# Patient Record
Sex: MALE | Race: WHITE | Employment: FULL TIME | URBAN - METROPOLITAN AREA
[De-identification: names, ages, dates, MRNs, and addresses within clinical notes are randomized per-mention and may not be internally consistent; named-entity substitution may affect disease eponyms.]

---

## 2017-01-04 ENCOUNTER — TELEPHONE (OUTPATIENT)
Dept: INTERNAL MEDICINE CLINIC | Facility: CLINIC | Age: 47
End: 2017-01-04

## 2017-01-04 DIAGNOSIS — E66.01 MORBID OBESITY DUE TO EXCESS CALORIES (HCC): Primary | ICD-10-CM

## 2017-01-04 DIAGNOSIS — E11.65 UNCONTROLLED TYPE 2 DIABETES MELLITUS WITH HYPERGLYCEMIA, WITHOUT LONG-TERM CURRENT USE OF INSULIN (HCC): ICD-10-CM

## 2017-01-04 PROBLEM — IMO0002 UNCONTROLLED DIABETES MELLITUS: Status: ACTIVE | Noted: 2017-01-04

## 2017-01-04 NOTE — TELEPHONE ENCOUNTER
I called patient again (last call in 10/2016) to inform him he is long overdue for labs and office visit. Orders are in the system. I left a message explaining he needs to have labs done asap and see me a few days later.

## 2017-01-10 ENCOUNTER — MED REC SCAN ONLY (OUTPATIENT)
Dept: INTERNAL MEDICINE CLINIC | Facility: CLINIC | Age: 47
End: 2017-01-10

## 2017-02-24 ENCOUNTER — OFFICE VISIT (OUTPATIENT)
Dept: INTERNAL MEDICINE CLINIC | Facility: CLINIC | Age: 47
End: 2017-02-24

## 2017-02-24 VITALS
BODY MASS INDEX: 43 KG/M2 | DIASTOLIC BLOOD PRESSURE: 86 MMHG | TEMPERATURE: 99 F | WEIGHT: 315 LBS | SYSTOLIC BLOOD PRESSURE: 136 MMHG | OXYGEN SATURATION: 96 % | HEART RATE: 95 BPM

## 2017-02-24 DIAGNOSIS — Z72.0 TOBACCO USE: ICD-10-CM

## 2017-02-24 DIAGNOSIS — J02.0 ACUTE STREPTOCOCCAL PHARYNGITIS: Primary | ICD-10-CM

## 2017-02-24 DIAGNOSIS — E66.01 MORBID OBESITY DUE TO EXCESS CALORIES (HCC): ICD-10-CM

## 2017-02-24 DIAGNOSIS — E11.65 UNCONTROLLED TYPE 2 DIABETES MELLITUS WITH HYPERGLYCEMIA, WITHOUT LONG-TERM CURRENT USE OF INSULIN (HCC): ICD-10-CM

## 2017-02-24 LAB
CONTROL LINE PRESENT WITH A CLEAR BACKGROUND (YES/NO): YES YES/NO
STREP GRP A CUL-SCR: POSITIVE

## 2017-02-24 PROCEDURE — 99214 OFFICE O/P EST MOD 30 MIN: CPT | Performed by: INTERNAL MEDICINE

## 2017-02-24 PROCEDURE — 87880 STREP A ASSAY W/OPTIC: CPT | Performed by: INTERNAL MEDICINE

## 2017-02-24 RX ORDER — PROMETHAZINE HYDROCHLORIDE AND CODEINE PHOSPHATE 6.25; 1 MG/5ML; MG/5ML
5 SYRUP ORAL EVERY 6 HOURS PRN
Qty: 118 ML | Refills: 0 | Status: SHIPPED | OUTPATIENT
Start: 2017-02-24 | End: 2017-08-26

## 2017-02-24 RX ORDER — CEPHALEXIN 500 MG/1
500 CAPSULE ORAL 2 TIMES DAILY
Qty: 20 CAPSULE | Refills: 0 | Status: SHIPPED | OUTPATIENT
Start: 2017-02-24 | End: 2017-03-03

## 2017-02-24 NOTE — PROGRESS NOTES
Terrence Kumar is a 40year old male. HPI:   Patient presents with:  Cough: Dry cough, sore throat, and a lot of sweating x 2 days. Vomiting: all day yesterday. Last meal was 2/22/17. Has been drinking tea.    Patient presents with two days of uppe He has been smoking about 0.00 packs per day for the past 0 years. He does not have any smokeless tobacco history on file. He reports that he does not drink alcohol or use illicit drugs.     Wt Readings from Last 6 Encounters:  02/24/17 : 376 lb 4 oz  08/31 patient indicates understanding of these issues and agrees to the plan. The patient is asked to return to clinic asap with Dr. Amelie Yuen MD for follow up on chronic issues.     Brian Crain MD

## 2017-03-03 ENCOUNTER — TELEPHONE (OUTPATIENT)
Dept: INTERNAL MEDICINE CLINIC | Facility: CLINIC | Age: 47
End: 2017-03-03

## 2017-03-03 RX ORDER — PENICILLIN V POTASSIUM 500 MG/1
500 TABLET ORAL 3 TIMES DAILY
Qty: 30 TABLET | Refills: 0 | Status: SHIPPED | OUTPATIENT
Start: 2017-03-03 | End: 2017-03-13

## 2017-03-03 NOTE — TELEPHONE ENCOUNTER
Prescription sent in for penicillin V, 1 tab three times daily x 10 days. Should start this in place of cephalexin.

## 2017-03-03 NOTE — TELEPHONE ENCOUNTER
Pt was seen 2/24/17, DX with strep A, RX cephalexin 500 mg BID #20 and promethazine/codine. Pt stated that he has been taking both medications with no relief. Still c/o ST, PND, dry cough, SOB, chills and sweats.  Pt denies wheezing/fever/body aches/inc

## 2017-06-02 ENCOUNTER — MED REC SCAN ONLY (OUTPATIENT)
Dept: INTERNAL MEDICINE CLINIC | Facility: CLINIC | Age: 47
End: 2017-06-02

## 2017-07-03 ENCOUNTER — TELEPHONE (OUTPATIENT)
Dept: INTERNAL MEDICINE CLINIC | Facility: CLINIC | Age: 47
End: 2017-07-03

## 2017-07-12 NOTE — TELEPHONE ENCOUNTER
Spoke with Marlin Kruse,  from [facility] [place]. They do not have a different address for this patient on file. Based on the letter effective 7/1/2017- Dr. Bertha Hale can be removed as PCP.

## 2017-08-26 ENCOUNTER — HOSPITAL ENCOUNTER (EMERGENCY)
Facility: HOSPITAL | Age: 47
Discharge: HOME | End: 2017-08-26
Attending: EMERGENCY MEDICINE
Payer: COMMERCIAL

## 2017-08-26 VITALS
SYSTOLIC BLOOD PRESSURE: 148 MMHG | BODY MASS INDEX: 36.45 KG/M2 | DIASTOLIC BLOOD PRESSURE: 85 MMHG | RESPIRATION RATE: 17 BRPM | TEMPERATURE: 97 F | OXYGEN SATURATION: 97 % | HEIGHT: 78 IN | HEART RATE: 78 BPM | WEIGHT: 315 LBS

## 2017-08-26 DIAGNOSIS — E11.65 TYPE 2 DIABETES MELLITUS WITH HYPERGLYCEMIA, WITHOUT LONG-TERM CURRENT USE OF INSULIN (HCC): Primary | ICD-10-CM

## 2017-08-26 DIAGNOSIS — R42 DIZZINESS: ICD-10-CM

## 2017-08-26 LAB
ALBUMIN SERPL-MCNC: 3.5 G/DL (ref 3.5–4.8)
ALP LIVER SERPL-CCNC: 84 U/L (ref 45–117)
ALT SERPL-CCNC: 38 U/L (ref 17–63)
AST SERPL-CCNC: 18 U/L (ref 15–41)
ATRIAL RATE: 86 BPM
BASOPHILS # BLD AUTO: 0.02 X10(3) UL (ref 0–0.1)
BASOPHILS NFR BLD AUTO: 0.2 %
BILIRUB SERPL-MCNC: 0.7 MG/DL (ref 0.1–2)
BUN BLD-MCNC: 12 MG/DL (ref 8–20)
CALCIUM BLD-MCNC: 9.4 MG/DL (ref 8.3–10.3)
CHLORIDE: 99 MMOL/L (ref 101–111)
CO2: 26 MMOL/L (ref 22–32)
CREAT BLD-MCNC: 0.9 MG/DL (ref 0.7–1.3)
EOSINOPHIL # BLD AUTO: 0.21 X10(3) UL (ref 0–0.3)
EOSINOPHIL NFR BLD AUTO: 2.2 %
ERYTHROCYTE [DISTWIDTH] IN BLOOD BY AUTOMATED COUNT: 12 % (ref 11.5–16)
GLUCOSE BLD-MCNC: 261 MG/DL (ref 65–99)
GLUCOSE BLD-MCNC: 322 MG/DL (ref 70–99)
HCT VFR BLD AUTO: 43.9 % (ref 37–53)
HGB BLD-MCNC: 15.3 G/DL (ref 13–17)
IMMATURE GRANULOCYTE COUNT: 0.03 X10(3) UL (ref 0–1)
IMMATURE GRANULOCYTE RATIO %: 0.3 %
LYMPHOCYTES # BLD AUTO: 2.61 X10(3) UL (ref 0.9–4)
LYMPHOCYTES NFR BLD AUTO: 27 %
M PROTEIN MFR SERPL ELPH: 8 G/DL (ref 6.1–8.3)
MCH RBC QN AUTO: 29.5 PG (ref 27–33.2)
MCHC RBC AUTO-ENTMCNC: 34.9 G/DL (ref 31–37)
MCV RBC AUTO: 84.7 FL (ref 80–99)
MONOCYTES # BLD AUTO: 0.65 X10(3) UL (ref 0.1–0.6)
MONOCYTES NFR BLD AUTO: 6.7 %
NEUTROPHIL ABS PRELIM: 6.16 X10 (3) UL (ref 1.3–6.7)
NEUTROPHILS # BLD AUTO: 6.16 X10(3) UL (ref 1.3–6.7)
NEUTROPHILS NFR BLD AUTO: 63.6 %
P AXIS: 60 DEGREES
P-R INTERVAL: 174 MS
PLATELET # BLD AUTO: 278 10(3)UL (ref 150–450)
POTASSIUM SERPL-SCNC: 4.3 MMOL/L (ref 3.6–5.1)
Q-T INTERVAL: 362 MS
QRS DURATION: 92 MS
QTC CALCULATION (BEZET): 433 MS
R AXIS: 3 DEGREES
RBC # BLD AUTO: 5.18 X10(6)UL (ref 4.3–5.7)
RED CELL DISTRIBUTION WIDTH-SD: 36.7 FL (ref 35.1–46.3)
SODIUM SERPL-SCNC: 134 MMOL/L (ref 136–144)
T AXIS: 15 DEGREES
VENTRICULAR RATE: 86 BPM
WBC # BLD AUTO: 9.7 X10(3) UL (ref 4–13)

## 2017-08-26 PROCEDURE — 99284 EMERGENCY DEPT VISIT MOD MDM: CPT

## 2017-08-26 PROCEDURE — 85025 COMPLETE CBC W/AUTO DIFF WBC: CPT | Performed by: EMERGENCY MEDICINE

## 2017-08-26 PROCEDURE — 96361 HYDRATE IV INFUSION ADD-ON: CPT

## 2017-08-26 PROCEDURE — 82962 GLUCOSE BLOOD TEST: CPT

## 2017-08-26 PROCEDURE — 96360 HYDRATION IV INFUSION INIT: CPT

## 2017-08-26 PROCEDURE — 93010 ELECTROCARDIOGRAM REPORT: CPT

## 2017-08-26 PROCEDURE — 96372 THER/PROPH/DIAG INJ SC/IM: CPT

## 2017-08-26 PROCEDURE — 93005 ELECTROCARDIOGRAM TRACING: CPT

## 2017-08-26 PROCEDURE — 80053 COMPREHEN METABOLIC PANEL: CPT | Performed by: EMERGENCY MEDICINE

## 2017-08-26 RX ORDER — GLIPIZIDE 10 MG/1
5 TABLET ORAL
Qty: 20 TABLET | Refills: 0 | Status: SHIPPED | OUTPATIENT
Start: 2017-08-26

## 2017-08-26 RX ORDER — INSULIN ASPART 100 [IU]/ML
0.1 INJECTION, SOLUTION INTRAVENOUS; SUBCUTANEOUS ONCE
Status: COMPLETED | OUTPATIENT
Start: 2017-08-26 | End: 2017-08-26

## 2017-08-26 NOTE — ED PROVIDER NOTES
Patient Seen in: [facility] Emergency Department    History   Patient presents with:  Dizziness (neurologic)  Hyperglycemia (metabolic)    Stated Complaint: dizziness    HPI    Patient is a 64-year-old male who is a .   Patient says he 84  Resp: 20  Temp: (!) 97 °F (36.1 °C)  Temp src: Temporal  SpO2: 95 %  O2 Device: None (Room air)    Current:/83   Pulse 80   Temp (!) 97 °F (36.1 °C) (Temporal)   Resp 18   Ht 198.1 cm (6' 6\")   Wt (!) 158.8 kg   SpO2 97%   BMI 40.45 kg/m² noted.   Otherwise, agree with EKG report           ============================================================  ED Course  ------------------------------------------------------------  MDM   Patient is a 28 pound 81-year-old gentleman who presents to the

## 2017-08-26 NOTE — ED INITIAL ASSESSMENT (HPI)
Patient had to spray [place] capsum at his job at [employer] on 8/25. [place] \"off\" the rest of the day, had elevated BP and blood sugar 300+.

## 2017-09-09 ENCOUNTER — HOSPITAL ENCOUNTER (EMERGENCY)
Facility: HOSPITAL | Age: 47
Discharge: HOME | End: 2017-09-09
Attending: EMERGENCY MEDICINE
Payer: COMMERCIAL

## 2017-09-09 VITALS
BODY MASS INDEX: 36.45 KG/M2 | DIASTOLIC BLOOD PRESSURE: 72 MMHG | OXYGEN SATURATION: 94 % | WEIGHT: 315 LBS | HEART RATE: 100 BPM | HEIGHT: 78 IN | SYSTOLIC BLOOD PRESSURE: 128 MMHG | RESPIRATION RATE: 18 BRPM | TEMPERATURE: 97 F

## 2017-09-09 DIAGNOSIS — L02.31 GLUTEAL ABSCESS: Primary | ICD-10-CM

## 2017-09-09 PROCEDURE — 99283 EMERGENCY DEPT VISIT LOW MDM: CPT

## 2017-09-09 PROCEDURE — 10060 I&D ABSCESS SIMPLE/SINGLE: CPT

## 2017-09-09 RX ORDER — ASPIRIN 81 MG/1
TABLET, CHEWABLE ORAL DAILY
COMMUNITY

## 2017-09-09 RX ORDER — CEPHALEXIN 500 MG/1
500 CAPSULE ORAL 3 TIMES DAILY
Qty: 40 CAPSULE | Refills: 0 | Status: SHIPPED | OUTPATIENT
Start: 2017-09-09 | End: 2017-09-19

## 2017-09-09 RX ORDER — HYDROCODONE BITARTRATE AND ACETAMINOPHEN 5; 325 MG/1; MG/1
1-2 TABLET ORAL EVERY 4 HOURS PRN
Qty: 20 TABLET | Refills: 0 | Status: SHIPPED | OUTPATIENT
Start: 2017-09-09 | End: 2017-09-16

## 2017-09-09 NOTE — ED INITIAL ASSESSMENT (HPI)
Here for an evaluation of either a \"boil or abscess\" in his left buttock area. Patient said it's been there for a couple weeks, with some drainage. Patient said over the past couple days, area is more swollen and painful.

## 2017-09-11 NOTE — ED PROVIDER NOTES
Patient Seen in: [facility] Emergency Department    History   Patient presents with:  Abscess (integumentary)    Stated Complaint: abscess    HPI    41-year-old male presents emergency department who states he started feeling an abscess or swelling is alert and in no acute distress  HEENT: Pupils equal react to light extraocular muscles intact no scleral icterus, mucous membranes are moist, there is no erythema or exudate in the posterior pharynx  Neck: Supple no JVD no lymphadenopathy no meningismus [id number]  [phone]            Medications Prescribed:  Discharge Medication List as of 9/9/2017  2:04 PM    START taking these medications    HYDROcodone-acetaminophen 5-325 MG Oral Tab  Take 1-2 tablets by mouth every 4 (four) hours as needed for Pa

## 2017-10-05 ENCOUNTER — OFFICE VISIT (OUTPATIENT)
Dept: SURGERY | Facility: CLINIC | Age: 47
End: 2017-10-05

## 2017-10-05 VITALS
HEART RATE: 91 BPM | DIASTOLIC BLOOD PRESSURE: 88 MMHG | HEIGHT: 78 IN | TEMPERATURE: 98 F | SYSTOLIC BLOOD PRESSURE: 124 MMHG | BODY MASS INDEX: 36.45 KG/M2 | WEIGHT: 315 LBS

## 2017-10-05 DIAGNOSIS — L02.31 LEFT BUTTOCK ABSCESS: Primary | ICD-10-CM

## 2017-10-05 PROCEDURE — 10061 I&D ABSCESS COMP/MULTIPLE: CPT | Performed by: SURGERY

## 2017-10-05 PROCEDURE — 99243 OFF/OP CNSLTJ NEW/EST LOW 30: CPT | Performed by: SURGERY

## 2017-10-05 RX ORDER — ATORVASTATIN CALCIUM 40 MG/1
40 TABLET, FILM COATED ORAL NIGHTLY
COMMUNITY

## 2017-10-05 RX ORDER — CIPROFLOXACIN 500 MG/1
TABLET, FILM COATED ORAL 3 TIMES DAILY
COMMUNITY
End: 2018-02-12

## 2017-10-05 NOTE — H&P
New Patient Visit Note       Active Problems      1.  Left buttock abscess        Chief Complaint   Patient presents with:  Anal Problem (GI): boil on buttocks, on Cipro tid, decresed pain and swelling with antibiotic, drains intermittently, I&D 9/11 in ER Topics    Smoking status: Current Some Day Smoker                                                      Packs/day: 0.00      Years: 0.00           Types: Cigarettes    Smokeless tobacco: Never Used                        Comment: Smokes 2-3 cigarettes daily Psychiatric/Behavioral: Negative for behavioral problems and sleep disturbance.        Physical Findings   /88   Pulse 91   Temp 97.8 °F (36.6 °C)   Ht 78\"   Wt (!) 370 lb   BMI 42.76 kg/m²   Physical Exam   Constitutional: He is oriented to person note and vitals reviewed. Assessment   Left buttock abscess  (primary encounter diagnosis)      Plan     ·  Successful incision and drainage of left buttock abscess in the office today.   · Wound care is discussed with the patient at length and he

## 2017-10-12 ENCOUNTER — OFFICE VISIT (OUTPATIENT)
Dept: SURGERY | Facility: CLINIC | Age: 47
End: 2017-10-12

## 2017-10-12 VITALS
TEMPERATURE: 98 F | HEART RATE: 86 BPM | SYSTOLIC BLOOD PRESSURE: 143 MMHG | BODY MASS INDEX: 36.45 KG/M2 | DIASTOLIC BLOOD PRESSURE: 95 MMHG | WEIGHT: 315 LBS | HEIGHT: 78 IN

## 2017-10-12 DIAGNOSIS — L02.31 LEFT BUTTOCK ABSCESS: Primary | ICD-10-CM

## 2017-10-12 PROCEDURE — 99024 POSTOP FOLLOW-UP VISIT: CPT | Performed by: SURGERY

## 2017-10-12 NOTE — PROGRESS NOTES
Follow Up Visit Note       Active Problems      1. Left buttock abscess          Chief Complaint   Patient presents with:  Anal Problem (GI): 1 week follow up Left buttock abscess.  Still on antibiotics,no pain, minimal drainage        History of Present Il Concern        Yes    Comment:0-1 cup of coffee daily and 1-2 cups of tea             daily   Exercise                Yes    Comment:5x/week. Current Outpatient Prescriptions:  Ciprofloxacin HCl 500 MG Oral Tab Take by mouth 3 (three) times daily. icterus. Neck: No tracheal deviation present. Genitourinary: Rectal exam shows no external hemorrhoid, no internal hemorrhoid, no fissure, no mass, no tenderness and anal tone normal. Prostate is not enlarged and not tender.          Genitourinary Comme

## 2018-02-12 ENCOUNTER — HOSPITAL ENCOUNTER (EMERGENCY)
Facility: HOSPITAL | Age: 48
Discharge: HOME | End: 2018-02-12
Attending: EMERGENCY MEDICINE
Payer: COMMERCIAL

## 2018-02-12 VITALS
RESPIRATION RATE: 18 BRPM | SYSTOLIC BLOOD PRESSURE: 144 MMHG | DIASTOLIC BLOOD PRESSURE: 81 MMHG | HEIGHT: 78 IN | TEMPERATURE: 97 F | OXYGEN SATURATION: 96 % | BODY MASS INDEX: 36.45 KG/M2 | WEIGHT: 315 LBS | HEART RATE: 108 BPM

## 2018-02-12 DIAGNOSIS — H92.02 LEFT EAR PAIN: Primary | ICD-10-CM

## 2018-02-12 PROCEDURE — 99282 EMERGENCY DEPT VISIT SF MDM: CPT

## 2018-02-12 NOTE — ED INITIAL ASSESSMENT (HPI)
Pt with pain to left ear and radiates down to neck. Pt reports blowing his nose, the ear \"popped\", then he had intense pain. Pt denies fever or dizziness. 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

## 2018-02-12 NOTE — ED PROVIDER NOTES
Patient Seen in: [facility] Emergency Department    History   Patient presents with:  Headache (neurologic)  Cough/URI    Stated Complaint: head pain/nasal congestion/ear     HPI    The patient is a 60-year-old gentleman complains of left ear pain a kg/m²         Physical Exam   Constitutional: He is oriented to person, place, and time. He appears well-developed and well-nourished. No distress. HENT:   Head: Normocephalic and atraumatic.    Mouth/Throat: Oropharynx is clear and moist. No oropharyngea MD  [address]  [phone]    In 2 days          Medications Prescribed:  Current Discharge Medication List

## 2020-03-02 ENCOUNTER — HOSPITAL ENCOUNTER (EMERGENCY)
Facility: HOSPITAL | Age: 50
Discharge: HOME | End: 2020-03-02
Attending: EMERGENCY MEDICINE
Payer: COMMERCIAL

## 2020-03-02 VITALS
TEMPERATURE: 97 F | HEART RATE: 96 BPM | HEIGHT: 78 IN | DIASTOLIC BLOOD PRESSURE: 106 MMHG | WEIGHT: 315 LBS | OXYGEN SATURATION: 93 % | RESPIRATION RATE: 16 BRPM | SYSTOLIC BLOOD PRESSURE: 159 MMHG | BODY MASS INDEX: 36.45 KG/M2

## 2020-03-02 DIAGNOSIS — K08.89 SUBLUXATION OF TOOTH: Primary | ICD-10-CM

## 2020-03-02 PROCEDURE — 99283 EMERGENCY DEPT VISIT LOW MDM: CPT

## 2020-03-02 RX ORDER — IBUPROFEN 600 MG/1
600 TABLET ORAL EVERY 8 HOURS PRN
Qty: 30 TABLET | Refills: 0 | Status: SHIPPED | OUTPATIENT
Start: 2020-03-02 | End: 2020-03-09

## 2020-03-02 RX ORDER — HYDROCODONE BITARTRATE AND ACETAMINOPHEN 5; 325 MG/1; MG/1
1 TABLET ORAL EVERY 6 HOURS PRN
Qty: 10 TABLET | Refills: 0 | Status: SHIPPED | OUTPATIENT
Start: 2020-03-02 | End: 2020-03-09

## 2020-03-02 RX ORDER — PENICILLIN V POTASSIUM 500 MG/1
500 TABLET ORAL 4 TIMES DAILY
Qty: 40 TABLET | Refills: 0 | Status: SHIPPED | OUTPATIENT
Start: 2020-03-02 | End: 2020-03-12

## 2020-03-02 NOTE — ED PROVIDER NOTES
Patient Seen in: [facility] Emergency Department      History   Patient presents with:  Dental Problem  Facial Trauma    Stated Complaint: Rt lower dental pain due to facial injury? - hit face with a wrench    HPI    The patient is a 46-year-old mal 95 %   O2 Device None (Room air)       Current:BP (!) 159/106   Pulse 96   Temp 97.2 °F (36.2 °C) (Temporal)   Resp 16   Ht 198.1 cm (6' 6\")   Wt (!) 150.1 kg   SpO2 93%   BMI 38.25 kg/m²         Physical Exam    General: Comfortable and well appearing.  A prophylactic antibiotics. He is to return if develops any worrisome symptoms. Otherwise he is to follow-up with oral surgery. He felt comfortable with this plan is discharged home in stable condition.               Disposition and Plan     Clinical Impre

## 2020-03-02 NOTE — ED INITIAL ASSESSMENT (HPI)
Presents with right lower molar tooth pain. On Sunday am was working on car and hit lower jaw with a wrench, later in afternoon was at work eating lunch and felt a \" crunch \" on tooth,tooth now loose and painful.

## 2020-05-03 ENCOUNTER — APPOINTMENT (OUTPATIENT)
Dept: CT IMAGING | Facility: HOSPITAL | Age: 50
End: 2020-05-03
Attending: EMERGENCY MEDICINE
Payer: COMMERCIAL

## 2020-05-03 ENCOUNTER — HOSPITAL ENCOUNTER (EMERGENCY)
Facility: HOSPITAL | Age: 50
Discharge: ANOTHER HOSPITAL | End: 2020-05-04
Attending: EMERGENCY MEDICINE
Payer: COMMERCIAL

## 2020-05-03 DIAGNOSIS — L03.115 CELLULITIS OF RIGHT LEG: Primary | ICD-10-CM

## 2020-05-03 PROCEDURE — 99223 1ST HOSP IP/OBS HIGH 75: CPT | Performed by: HOSPITALIST

## 2020-05-03 PROCEDURE — 74177 CT ABD & PELVIS W/CONTRAST: CPT | Performed by: EMERGENCY MEDICINE

## 2020-05-03 RX ORDER — ONDANSETRON 2 MG/ML
4 INJECTION INTRAMUSCULAR; INTRAVENOUS EVERY 6 HOURS PRN
Status: CANCELLED | OUTPATIENT
Start: 2020-05-03

## 2020-05-03 RX ORDER — ACETAMINOPHEN 325 MG/1
650 TABLET ORAL EVERY 6 HOURS PRN
Status: CANCELLED | OUTPATIENT
Start: 2020-05-03

## 2020-05-03 RX ORDER — HYDROMORPHONE HYDROCHLORIDE 1 MG/ML
0.5 INJECTION, SOLUTION INTRAMUSCULAR; INTRAVENOUS; SUBCUTANEOUS EVERY 30 MIN PRN
Status: DISCONTINUED | OUTPATIENT
Start: 2020-05-03 | End: 2020-05-04

## 2020-05-03 RX ORDER — ACETAMINOPHEN 500 MG
1000 TABLET ORAL ONCE
Status: COMPLETED | OUTPATIENT
Start: 2020-05-03 | End: 2020-05-03

## 2020-05-03 RX ORDER — SODIUM CHLORIDE, SODIUM LACTATE, POTASSIUM CHLORIDE, CALCIUM CHLORIDE 600; 310; 30; 20 MG/100ML; MG/100ML; MG/100ML; MG/100ML
INJECTION, SOLUTION INTRAVENOUS CONTINUOUS
Status: CANCELLED | OUTPATIENT
Start: 2020-05-03

## 2020-05-03 RX ORDER — VANCOMYCIN 2 GRAM/500 ML IN 0.9 % SODIUM CHLORIDE INTRAVENOUS
25 ONCE
Status: CANCELLED | OUTPATIENT
Start: 2020-05-03 | End: 2020-05-03

## 2020-05-03 RX ORDER — DEXTROSE MONOHYDRATE 25 G/50ML
50 INJECTION, SOLUTION INTRAVENOUS
Status: CANCELLED | OUTPATIENT
Start: 2020-05-03

## 2020-05-03 RX ORDER — ENOXAPARIN SODIUM 100 MG/ML
40 INJECTION SUBCUTANEOUS DAILY
Status: CANCELLED | OUTPATIENT
Start: 2020-05-03

## 2020-05-03 RX ORDER — METOCLOPRAMIDE HYDROCHLORIDE 5 MG/ML
5 INJECTION INTRAMUSCULAR; INTRAVENOUS EVERY 8 HOURS PRN
Status: CANCELLED | OUTPATIENT
Start: 2020-05-03

## 2020-05-03 RX ORDER — ACETAMINOPHEN 500 MG
1000 TABLET ORAL ONCE
Status: DISCONTINUED | OUTPATIENT
Start: 2020-05-03 | End: 2020-05-03

## 2020-05-03 RX ORDER — ATORVASTATIN CALCIUM 40 MG/1
40 TABLET, FILM COATED ORAL NIGHTLY
Status: CANCELLED | OUTPATIENT
Start: 2020-05-03

## 2020-05-03 RX ORDER — VANCOMYCIN 2 GRAM/500 ML IN 0.9 % SODIUM CHLORIDE INTRAVENOUS
25 ONCE
Status: COMPLETED | OUTPATIENT
Start: 2020-05-03 | End: 2020-05-04

## 2020-05-03 RX ORDER — ASPIRIN 81 MG/1
81 TABLET, CHEWABLE ORAL DAILY
Status: CANCELLED | OUTPATIENT
Start: 2020-05-03

## 2020-05-04 VITALS
HEART RATE: 111 BPM | TEMPERATURE: 102 F | SYSTOLIC BLOOD PRESSURE: 160 MMHG | DIASTOLIC BLOOD PRESSURE: 79 MMHG | HEIGHT: 78 IN | WEIGHT: 315 LBS | BODY MASS INDEX: 36.45 KG/M2 | RESPIRATION RATE: 21 BRPM | OXYGEN SATURATION: 92 %

## 2020-05-04 NOTE — ED NOTES
Report received from previous RN.   Patient returned from CT Scan, appears well, up to the restroom at this time

## 2020-05-04 NOTE — ED PROVIDER NOTES
Ct Abdomen Pelvis Iv Contrast, No Oral (er)    Result Date: 5/3/2020  PROCEDURE:  CT ABDOMEN PELVIS IV CONTRAST, NO ORAL (ER)  COMPARISON:  None.   INDICATIONS:  cellulitis  TECHNIQUE:  CT scanning was performed from the dome of the diaphragm to the pubic s gangrene. This is suggestive of necrotizing fasciitis. A fluid collection is not identified. Skin thickening and infiltration of the fat throughout the right inguinal region extending towards the perineum is noted.   This critical result was discussed wi

## 2020-05-04 NOTE — ED PROVIDER NOTES
Patient Seen in: [facility] Emergency Department      History   Patient presents with:  Rash Skin Problem    Stated Complaint: cellulitis    HPI    43-year-old male comes to the hospital with a complaint of having pain in the area of his right groin No LAD  Heart: S1S2 normal. No murmurs, regular rate and rhythm  Lungs: Clear to auscultation bilaterally  Abdomen: Soft nontender nondistended normal active bowel sounds without rebound, guarding or masses noted  Back nontender without CVA tenderness  Ext HYDROmorphone HCl (DILAUDID) 1 MG/ML injection 0.5 mg (0.5 mg Intravenous Given 5/3/20 2042)   sodium chloride 0.9% IV bolus 1,000 mL (0 mL Intravenous Stopped 5/3/20 2142)   acetaminophen (TYLENOL EXTRA STRENGTH) tab 1,000 mg (1,000 mg Oral Given 5/3/20

## 2020-05-04 NOTE — H&P
[facility]  History and Physical     Aimee Serum Patient Status:  Emergency    10/25/1970 MRN [id number]   Location [address] [facility] Attending Timothy Forde MD   Hosp Day # 0 PCP Danny Amor MD     Chief Complai before meals. , Disp: 20 tablet, Rfl: 0        Review of Systems:   A comprehensive 14 point review of systems was completed. Pertinent positives and negatives noted in the HPI.     Physical Exam:    BP (!) 161/66   Pulse (!) 123   Temp (!) 101.1 °F (38.4 full  · Ardon: no    Plan of care discussed with ED physician    Anna De La Paz MD  5/3/2020

## 2020-05-04 NOTE — PROGRESS NOTES
Pharmacy Note:  Emergency Department Antimicrobial Dose Adjustment         Gregory Monaco was prescribed ceftriaxone 1g x1 dose in the ED. Weight 158.8 kg / Body mass index is 40.45 kg/m².     Based on weight >100 kg, the dose was adjusted to ceftriaxo

## 2021-04-10 DIAGNOSIS — Z23 NEED FOR VACCINATION: ICD-10-CM

## 2021-09-14 ENCOUNTER — HOSPITAL ENCOUNTER (EMERGENCY)
Facility: HOSPITAL | Age: 51
Discharge: HOME | End: 2021-09-15
Attending: EMERGENCY MEDICINE
Payer: COMMERCIAL

## 2021-09-14 DIAGNOSIS — M10.071 ACUTE IDIOPATHIC GOUT OF RIGHT FOOT: Primary | ICD-10-CM

## 2021-09-14 PROCEDURE — 99283 EMERGENCY DEPT VISIT LOW MDM: CPT

## 2021-09-15 ENCOUNTER — APPOINTMENT (OUTPATIENT)
Dept: GENERAL RADIOLOGY | Facility: HOSPITAL | Age: 51
End: 2021-09-15
Attending: EMERGENCY MEDICINE
Payer: COMMERCIAL

## 2021-09-15 VITALS
RESPIRATION RATE: 18 BRPM | HEART RATE: 72 BPM | HEIGHT: 78 IN | OXYGEN SATURATION: 97 % | BODY MASS INDEX: 36.45 KG/M2 | DIASTOLIC BLOOD PRESSURE: 77 MMHG | WEIGHT: 315 LBS | TEMPERATURE: 98 F | SYSTOLIC BLOOD PRESSURE: 121 MMHG

## 2021-09-15 PROCEDURE — 73630 X-RAY EXAM OF FOOT: CPT | Performed by: EMERGENCY MEDICINE

## 2021-09-15 RX ORDER — PREDNISONE 20 MG/1
60 TABLET ORAL DAILY
Qty: 15 TABLET | Refills: 0 | Status: SHIPPED | OUTPATIENT
Start: 2021-09-15 | End: 2021-09-20

## 2021-09-15 RX ORDER — HYDROCODONE BITARTRATE AND ACETAMINOPHEN 5; 325 MG/1; MG/1
1-2 TABLET ORAL EVERY 4 HOURS PRN
Qty: 20 TABLET | Refills: 0 | Status: SHIPPED | OUTPATIENT
Start: 2021-09-15 | End: 2021-09-22

## 2021-09-15 RX ORDER — INDOMETHACIN 25 MG/1
25 CAPSULE ORAL EVERY 6 HOURS
Qty: 10 CAPSULE | Refills: 0 | Status: SHIPPED | OUTPATIENT
Start: 2021-09-15 | End: 2021-09-17

## 2021-09-15 NOTE — ED PROVIDER NOTES
Patient Seen in: [facility] Emergency Department      History   Patient presents with: Foot Pain    Stated Complaint: right foot pain for one day.  unsure if he stepped on an object    Subjective:   HPI    52-year-old male presents emergency departm infection transmission during my interaction with the patient were taken. The patient was already wearing a droplet mask on my arrival to the room.  Personal protective equipment including droplet mask, eye protection, and gloves were worn throughout the du discharge, that an emergency medical condition was not or was no longer present. There was no indication for further evaluation, treatment, or admission on an emergency basis.   Comprehensive verbal and written discharge and follow-up instructions were pro

## 2025-03-06 ENCOUNTER — HOSPITAL ENCOUNTER (OUTPATIENT)
Age: 55
Discharge: HOME | End: 2025-03-06
Attending: EMERGENCY MEDICINE
Payer: COMMERCIAL

## 2025-03-06 ENCOUNTER — APPOINTMENT (OUTPATIENT)
Dept: GENERAL RADIOLOGY | Age: 55
End: 2025-03-06
Attending: NURSE PRACTITIONER
Payer: COMMERCIAL

## 2025-03-06 ENCOUNTER — APPOINTMENT (OUTPATIENT)
Dept: ULTRASOUND IMAGING | Age: 55
End: 2025-03-06
Attending: NURSE PRACTITIONER
Payer: COMMERCIAL

## 2025-03-06 VITALS
DIASTOLIC BLOOD PRESSURE: 82 MMHG | BODY MASS INDEX: 31.82 KG/M2 | RESPIRATION RATE: 16 BRPM | TEMPERATURE: 98 F | WEIGHT: 275 LBS | OXYGEN SATURATION: 97 % | SYSTOLIC BLOOD PRESSURE: 164 MMHG | HEIGHT: 78 IN | HEART RATE: 98 BPM

## 2025-03-06 DIAGNOSIS — I82.811 SUPERFICIAL THROMBOSIS OF RIGHT LOWER EXTREMITY: Primary | ICD-10-CM

## 2025-03-06 PROCEDURE — 73610 X-RAY EXAM OF ANKLE: CPT | Performed by: NURSE PRACTITIONER

## 2025-03-06 PROCEDURE — 93971 EXTREMITY STUDY: CPT | Performed by: NURSE PRACTITIONER

## 2025-03-06 PROCEDURE — 99205 OFFICE O/P NEW HI 60 MIN: CPT

## 2025-03-06 PROCEDURE — 73630 X-RAY EXAM OF FOOT: CPT | Performed by: NURSE PRACTITIONER

## 2025-03-06 PROCEDURE — 99204 OFFICE O/P NEW MOD 45 MIN: CPT

## 2025-03-06 RX ORDER — PEN NEEDLE, DIABETIC 32GX 5/32"
1 NEEDLE, DISPOSABLE MISCELLANEOUS AS DIRECTED
COMMUNITY
Start: 2024-11-26

## 2025-03-06 RX ORDER — MULTIVITAMIN
1 CAPSULE ORAL DAILY
COMMUNITY

## 2025-03-06 RX ORDER — LISINOPRIL 10 MG/1
1 TABLET ORAL DAILY
COMMUNITY
Start: 2024-10-21

## 2025-03-06 RX ORDER — INSULIN ASPART 100 [IU]/ML
10 INJECTION, SOLUTION INTRAVENOUS; SUBCUTANEOUS
COMMUNITY

## 2025-03-06 RX ORDER — INSULIN GLARGINE 100 [IU]/ML
40 INJECTION, SOLUTION SUBCUTANEOUS NIGHTLY
COMMUNITY

## 2025-03-06 NOTE — ED INITIAL ASSESSMENT (HPI)
Right ankle swelling since Tuesday night. JULIOCESAR mccabe.    Abscess I&D of outer right foot last year.    Hx DM with neuropathy in feet.

## 2025-03-06 NOTE — ED PROVIDER NOTES
Venous Doppler of the right lower extremity indicates a superficial thrombosis in the lesser saphenous vein.  Based on proximity to the popliteal vein, recommended anticoagulation and hematology follow-up.

## 2025-03-06 NOTE — DISCHARGE INSTRUCTIONS
Please take blood thinning medicines as prescribed.  Follow closely with hematology, your primary, and podiatry.  If you experience chest pain, shortness of breath, or any head injury please go directly to the emergency department.

## 2025-03-06 NOTE — ED PROVIDER NOTES
Patient Seen in: [facility]      History     Chief Complaint   Patient presents with    Swelling Edema    Ankle Injury     Stated Complaint: Ankle/foot issue    Subjective:   This is a 54-year-old male with a history of type 2 diabetes, hypertension, gout, and peripheral neuropathy.  Presents to the immediate care for pain and swelling to the medial right ankle.  Symptoms started a few days ago.  Pain is worse when bearing weight.  No fevers.  No known trauma or injury to the area.  No calf pain or swelling.  No chest pain or shortness of breath.  he is not anticoagulated.    The history is provided by the patient.             Objective:     Past Medical History:    Abscess of anal or rectal region    Essential hypertension    Type II or unspecified type diabetes mellitus without mention of complication, not stated as uncontrolled              Past Surgical History:   Procedure Laterality Date    Other surgical history      cyst removal                Social History     Socioeconomic History    Marital status: Single   Tobacco Use    Smoking status: Some Days     Current packs/day: 0.00     Types: Cigarettes    Smokeless tobacco: Never    Tobacco comments:     Smokes 2-3 cigarettes daily, started 1991   Vaping Use    Vaping status: Never Used   Substance and Sexual Activity    Alcohol use: No    Drug use: No    Sexual activity: Yes   Other Topics Concern    Caffeine Concern Yes     Comment: 0-1 cup of coffee daily and 1-2 cups of tea daily     Exercise Yes     Comment: 5x/week.      Social Drivers of Health     Food Insecurity: Low Risk  (7/30/2024)    Received from [facility] Food Security     Within the past 12 months, the food you bought just didn't last and you didn't have money to get more.: 3     Within the past 12 months, you worried that your food would run out before you got money to buy more.: 3   Transportation Needs: Not At Risk (7/30/2024)    Received from [facility]  Transportation Needs     In the past 12 months, has lack of reliable transportation kept you from medical appointments, meetings, work or from getting things needed for daily living?: No   Housing Stability: Not At Risk (7/30/2024)    Received from [facility] Housing     What is your living situation today?: I have a steady place to live     Think about the place you live. Do you have problems with any of the following?: None of the above              Review of Systems   Constitutional:  Negative for chills and fever.   HENT:  Negative for congestion and sore throat.    Respiratory:  Negative for cough, shortness of breath and wheezing.    Cardiovascular:  Negative for chest pain, palpitations and leg swelling.   Gastrointestinal:  Negative for abdominal pain.   Genitourinary:  Negative for dysuria.   Musculoskeletal:  Positive for arthralgias and joint swelling. Negative for back pain, neck pain and neck stiffness.   Skin:  Negative for rash.       Positive for stated complaint: Ankle/foot issue  Other systems are as noted in HPI.  Constitutional and vital signs reviewed.      All other systems reviewed and negative except as noted above.    Physical Exam     ED Triage Vitals [03/06/25 1100]   BP (!) 164/82   Pulse 98   Resp 16   Temp 97.7 °F (36.5 °C)   Temp src Oral   SpO2 97 %   O2 Device None (Room air)       Current Vitals:   Vital Signs  BP: (!) 164/82  Pulse: 98  Resp: 16  Temp: 97.7 °F (36.5 °C)  Temp src: Oral    Oxygen Therapy  SpO2: 97 %  O2 Device: None (Room air)        Physical Exam  Vitals and nursing note reviewed.   Constitutional:       General: He is not in acute distress.     Appearance: Normal appearance. He is not ill-appearing, toxic-appearing or diaphoretic.   HENT:      Head: Normocephalic and atraumatic.      Right Ear: External ear normal.      Left Ear: External ear normal.      Nose: Nose normal.      Mouth/Throat:      Mouth: Mucous membranes are moist.      Pharynx:  Oropharynx is clear.   Eyes:      General:         Right eye: No discharge.         Left eye: No discharge.      Extraocular Movements: Extraocular movements intact.      Conjunctiva/sclera: Conjunctivae normal.   Cardiovascular:      Rate and Rhythm: Normal rate.      Heart sounds: Normal heart sounds.   Pulmonary:      Effort: Pulmonary effort is normal. No respiratory distress.      Breath sounds: Normal breath sounds. No stridor. No wheezing, rhonchi or rales.   Musculoskeletal:      Cervical back: Neck supple.      Right lower leg: Normal. No edema.      Left lower leg: No edema.      Right ankle: Swelling present. No deformity, ecchymosis or lacerations. Tenderness present over the medial malleolus. No lateral malleolus or proximal fibula tenderness. Decreased range of motion. Normal pulse.      Right Achilles Tendon: Normal.      Right foot: Normal.   Skin:     General: Skin is warm and dry.      Capillary Refill: Capillary refill takes less than 2 seconds.      Findings: No rash.   Neurological:      Mental Status: He is alert and oriented to person, place, and time.   Psychiatric:         Mood and Affect: Mood normal.         Behavior: Behavior normal.             ED Course   Labs Reviewed - No data to display         Xray right foot and ankle, ultrasound RLE       MDM      Vital signs stable.  Patient is well appearing and non toxic looking.  Presents to the IC for swelling and pain to the medial ankle.     Differential diagnosis includes but is no limited too sprain, contusion, cellulitis, septic joint, gout, fracture, arthritis, tendonitis, superficial thrombophlebitis, DVT    Moderate amount of swelling and tenderness to the medial ankle.  Skin is warm to touch but no skin changes.  No wounds to the foot or ankle.  Suspicion for infectious process.  No calf swelling or tenderness.  Distal CMS intact.    Xrays and ultrasound films reviewed and interpreted by myself. Results show severe degenerative  changes involving the hindfoot with soft tissue edema, concern for Charcot's arthropathy.    Ultrasound films show superficial clot of the lesser saphenous vein but no DVT.      This case was discussed with my attending physician Dr. CHRIS Hyatt.  Recommendations to place patient on anticoagulant therapy because last saphenous vein is close to the deep vein system with popliteal vein. Close follow up with hematology    Clinical impression is superficial thrombus of the lesser saphenous vein.    Dc home.  Rx given for Eliquis starter pack.  Risks of blood thinning medications including intracranial bleeding post head injury, development of chest pain or shortness of breath were discussed directly with patient.  If he is experiencing any of the symptoms he should go directly to the emergency department for reevaluation.  We discussed close follow-up with his primary, hematology, and podiatry.  Patient verbalized understanding and agreed with plan of care.  All questions were answered.              Medical Decision Making      Disposition and Plan     Clinical Impression:  1. Superficial thrombosis of right lower extremity         Disposition:  Discharge  3/6/2025 12:38 pm    Follow-up:  Sukumar Valencia MD  [address]  [address] [facility]  [facility] [address]  [phone]    In 1 week            Medications Prescribed:  Discharge Medication List as of 3/6/2025 12:42 PM        START taking these medications    Details   apixaban 5 MG Oral Tab Take 2 tabs (10mg) by mouth twice daily for 7 days, then take 1 tab (5mg) by mouth twice daily thereafter., Normal, Disp-74 tablet, R-0                 Supplementary Documentation:

## 2025-05-18 ENCOUNTER — HOSPITAL ENCOUNTER (OUTPATIENT)
Age: 55
Discharge: HOME | End: 2025-05-18
Payer: COMMERCIAL

## 2025-05-18 VITALS
DIASTOLIC BLOOD PRESSURE: 65 MMHG | TEMPERATURE: 100 F | SYSTOLIC BLOOD PRESSURE: 159 MMHG | RESPIRATION RATE: 20 BRPM | OXYGEN SATURATION: 98 % | HEART RATE: 117 BPM

## 2025-05-18 DIAGNOSIS — L02.91 ABSCESS: Primary | ICD-10-CM

## 2025-05-18 PROCEDURE — 99213 OFFICE O/P EST LOW 20 MIN: CPT

## 2025-05-18 PROCEDURE — 10060 I&D ABSCESS SIMPLE/SINGLE: CPT

## 2025-05-18 PROCEDURE — 99214 OFFICE O/P EST MOD 30 MIN: CPT

## 2025-05-18 RX ORDER — HYDROCODONE BITARTRATE AND ACETAMINOPHEN 5; 325 MG/1; MG/1
1-2 TABLET ORAL EVERY 6 HOURS PRN
Qty: 20 TABLET | Refills: 0 | Status: SHIPPED | OUTPATIENT
Start: 2025-05-18 | End: 2025-05-25

## 2025-05-18 RX ORDER — DOXYCYCLINE 100 MG/1
100 CAPSULE ORAL 2 TIMES DAILY
Qty: 14 CAPSULE | Refills: 0 | Status: SHIPPED | OUTPATIENT
Start: 2025-05-18 | End: 2025-05-25

## 2025-05-18 NOTE — DISCHARGE INSTRUCTIONS
Apply a warm compress to your abscess. Wet a washcloth in warm, but not hot, water, or an electric heating pad.   Apply the compress for 10 minutes. Repeat this 4-6 times each day.   Do not press on an abscess or try to open it with a needle.   Return for fever, increased redness and pain or any other concerns

## 2025-05-18 NOTE — ED PROVIDER NOTES
Patient Seen in: [facility]      History   No chief complaint on file.    Stated Complaint: boil    Subjective:   54-year-old male presents to immediate care for abscess to his left buttock.  He has a history of diabetes and noted some redness and swelling to his buttock has flared up over the last 24 hours with pain and increase in swelling      Objective:     Past Medical History:    Abscess of anal or rectal region    Essential hypertension    Type II or unspecified type diabetes mellitus without mention of complication, not stated as uncontrolled              Past Surgical History:   Procedure Laterality Date    Other surgical history      cyst removal                Social History     Socioeconomic History    Marital status: Single   Tobacco Use    Smoking status: Some Days     Current packs/day: 0.00     Types: Cigarettes    Smokeless tobacco: Never    Tobacco comments:     Smokes 2-3 cigarettes daily, started 1991   Vaping Use    Vaping status: Never Used   Substance and Sexual Activity    Alcohol use: No    Drug use: No    Sexual activity: Yes   Other Topics Concern    Caffeine Concern Yes     Comment: 0-1 cup of coffee daily and 1-2 cups of tea daily     Exercise Yes     Comment: 5x/week.      Social Drivers of Health     Food Insecurity: Low Risk  (4/30/2025)    Received from [facility] Food Security     Within the past 12 months, the food you bought just didn't last and you didn't have money to get more.: 3     Within the past 12 months, you worried that your food would run out before you got money to buy more.: 3   Transportation Needs: Not At Risk (4/30/2025)    Received from [facility] Transportation Needs     In the past 12 months, has lack of reliable transportation kept you from medical appointments, meetings, work or from getting things needed for daily living?: No   Housing Stability: Not At Risk (4/30/2025)    Received from [facility] Housing     What  is your living situation today?: I have a steady place to live     Think about the place you live. Do you have problems with any of the following?: None of the above              Review of Systems    Positive for stated complaint: boil  Other systems are as noted in HPI.  Constitutional and vital signs reviewed.      All other systems reviewed and negative except as noted above.                  Physical Exam     ED Triage Vitals [05/18/25 1519]   /65   Pulse 117   Resp 20   Temp 99.7 °F (37.6 °C)   Temp src Oral   SpO2 98 %   O2 Device None (Room air)       Current Vitals:   Vital Signs  BP: 159/65  Pulse: 117  Resp: 20  Temp: 99.7 °F (37.6 °C)  Temp src: Oral    Oxygen Therapy  SpO2: 98 %  O2 Device: None (Room air)          Physical Exam  Vitals and nursing note reviewed.   Constitutional:       General: He is not in acute distress.  HENT:      Head: Normocephalic.   Cardiovascular:      Rate and Rhythm: Normal rate.   Pulmonary:      Effort: Pulmonary effort is normal.   Musculoskeletal:         General: Normal range of motion.   Skin:     General: Skin is warm and dry.      Findings: Wound present.   Neurological:      General: No focal deficit present.      Mental Status: He is alert and oriented to person, place, and time.         ED Course   Labs Reviewed - No data to display  Consent: Verbal consent obtained.  Risks and benefits: Risks, benefits, and alternatives were discussed  Consent given by: Patient  Patient understanding: patient states understanding of the procedure being performed  Patient consent: the patient's understanding of the procedure matches consent given  Patient identity confirmed: arm band  Time out: Immediately prior to procedure a \"time out\" was called to verify the correct patient, procedure, equipment, support staff and site/side marked as required.  Type: abscess  Location details: Left buttock  Anesthesia: local infiltration  Local anesthetic: lidocaine 1% with  epinephrine  Patient sedated: no  Scalpel size: 11  Incision type: single straight  Complexity: simple  Drainage: purulent  Drainage amount: Moderate  Total procedure time 10-minute  Wound treatment: packed  Packing material: iodaform  Patient tolerance: Patient tolerated the procedure well with no immediate complications.            [facility]        Medical Decision Making  Pertinent Labs & Imaging studies reviewed. (See chart for details).  Patient coming in with abscess.   Differential diagnosis includes abscess     Patient is comfortable with plan of care.     Overall Pt looks good. Non-toxic, well-hydrated and in no respiratory distress. Vital signs are reassuring. Exam is reassuring. I do not believe pt requires and additional diagnostic studies or intervention. I believe pt can be discharged home to continue evaluation as an outpatient. Follow-up provider given. Discharge instructions given and reviewed. Return for any problems. All understand and agrees with the plan.        Problems Addressed:  Abscess: acute illness or injury    Risk  OTC drugs.  Prescription drug management.          Disposition and Plan     Clinical Impression:  1. Abscess         Disposition:  Discharge  5/18/2025  3:39 pm    Follow-up:  Aide San DO  [address] [place] [id number] [phone]                Medications Prescribed:  Current Discharge Medication List        START taking these medications    Details   HYDROcodone-acetaminophen 5-325 MG Oral Tab Take 1-2 tablets by mouth every 6 (six) hours as needed for Pain.  Qty: 20 tablet, Refills: 0    Associated Diagnoses: Abscess      doxycycline 100 MG Oral Cap Take 1 capsule (100 mg total) by mouth 2 (two) times daily for 7 days.  Qty: 14 capsule, Refills: 0    Associated Diagnoses: Abscess                   Supplementary Documentation:

## 2025-05-20 ENCOUNTER — HOSPITAL ENCOUNTER (OUTPATIENT)
Age: 55
Discharge: HOME | End: 2025-05-20
Payer: COMMERCIAL

## 2025-05-20 ENCOUNTER — HOSPITAL ENCOUNTER (OUTPATIENT)
Age: 55
Discharge: ANOTHER HOSPITAL | End: 2025-05-20
Payer: COMMERCIAL

## 2025-05-20 VITALS
HEART RATE: 110 BPM | TEMPERATURE: 98 F | SYSTOLIC BLOOD PRESSURE: 157 MMHG | DIASTOLIC BLOOD PRESSURE: 82 MMHG | BODY MASS INDEX: 31.82 KG/M2 | OXYGEN SATURATION: 98 % | RESPIRATION RATE: 22 BRPM | HEIGHT: 78 IN | WEIGHT: 275 LBS

## 2025-05-20 DIAGNOSIS — L03.317 CELLULITIS OF BUTTOCK: Primary | ICD-10-CM

## 2025-05-20 PROCEDURE — 99211 OFF/OP EST MAY X REQ PHY/QHP: CPT

## 2025-05-20 NOTE — DISCHARGE INSTRUCTIONS
Please go directly to the [place] emergency department that you have requested.  The abscess to the perineal buttock region is large red and hard.

## 2025-05-20 NOTE — ED PROVIDER NOTES
Patient Seen in: [facility]        History  Chief Complaint   Patient presents with    Wound Recheck    Abscess     Stated Complaint: Abcess worsening seen 5/18    Subjective:   HPI            54-year-old male with history of diabetes, hypertension, dyslipidemia, osteomyelitis, sepsis presents today with complaints of worsening abscess to the left lower buttock region.  Patient states he was here couple days ago and had the abscess excised but states that the redness and swelling is extending and he is having more pain.  Patient states he tried to change his own dressing but has a hard time doing so as he cannot see the area.  Patient states he was not sure if he should have come here or go to the ER.  Patient states he does not want to repeat testing if he is going to be sent to the ER.      Objective:     Past Medical History:    Abscess of anal or rectal region    Essential hypertension    Type II or unspecified type diabetes mellitus without mention of complication, not stated as uncontrolled              Past Surgical History:   Procedure Laterality Date    Other surgical history      cyst removal                Social History     Socioeconomic History    Marital status: Single   Tobacco Use    Smoking status: Some Days     Current packs/day: 0.00     Types: Cigarettes    Smokeless tobacco: Never    Tobacco comments:     Smokes 2-3 cigarettes daily, started 1991   Vaping Use    Vaping status: Never Used   Substance and Sexual Activity    Alcohol use: No    Drug use: No    Sexual activity: Yes   Other Topics Concern    Caffeine Concern Yes     Comment: 0-1 cup of coffee daily and 1-2 cups of tea daily     Exercise Yes     Comment: 5x/week.      Social Drivers of Health     Food Insecurity: Low Risk  (4/30/2025)    Received from [facility]     Within the past 12 months, the food you bought just didn't last and you didn't have money to get more.: 3     Within the past 12  months, you worried that your food would run out before you got money to buy more.: 3   Transportation Needs: Not At Risk (4/30/2025)    Received from [facility] Transportation Needs     In the past 12 months, has lack of reliable transportation kept you from medical appointments, meetings, work or from getting things needed for daily living?: No   Housing Stability: Not At Risk (4/30/2025)    Received from [facility] Housing     What is your living situation today?: I have a steady place to live     Think about the place you live. Do you have problems with any of the following?: None of the above              Review of Systems   Constitutional: Negative.    HENT: Negative.     Eyes: Negative.    Respiratory: Negative.     Cardiovascular: Negative.    Gastrointestinal: Negative.    Endocrine: Negative.    Genitourinary: Negative.    Musculoskeletal: Negative.    Skin:  Positive for wound.   Allergic/Immunologic: Negative.    Neurological: Negative.    Hematological: Negative.    Psychiatric/Behavioral: Negative.         Positive for stated complaint: Abcess worsening seen 5/18  Other systems are as noted in HPI.  Constitutional and vital signs reviewed.      All other systems reviewed and negative except as noted above.                  Physical Exam    ED Triage Vitals [05/20/25 1304]   /82   Pulse 110   Resp 22   Temp 98 °F (36.7 °C)   Temp src Oral   SpO2 98 %   O2 Device None (Room air)       Current Vitals:   Vital Signs  BP: 157/82  Pulse: 110  Resp: 22  Temp: 98 °F (36.7 °C)  Temp src: Oral    Oxygen Therapy  SpO2: 98 %  O2 Device: None (Room air)            Physical Exam  Vitals and nursing note reviewed.   Constitutional:       Appearance: Normal appearance. He is normal weight.   HENT:      Head: Normocephalic.      Right Ear: External ear normal.      Left Ear: External ear normal.   Eyes:      Extraocular Movements: Extraocular movements intact.      Conjunctiva/sclera:  Conjunctivae normal.      Pupils: Pupils are equal, round, and reactive to light.   Cardiovascular:      Rate and Rhythm: Regular rhythm. Tachycardia present.      Pulses: Normal pulses.      Heart sounds: Normal heart sounds.   Skin:     Findings: Erythema and lesion present.      Comments: Below the left buttock extending into the perineal scrotal region area is all red hard and not fluctuant.  Very tender to palpation.  Minimal drainage appreciated on the dressing.   Neurological:      Mental Status: He is alert.   Psychiatric:         Mood and Affect: Mood normal.                ED Course  Labs Reviewed - No data to display                           MDM       54-year-old male with history of diabetes, hypertension, dyslipidemia, osteomyelitis, sepsis presents today with complaints of worsening abscess to the left lower buttock region.  Patient states he was here couple days ago and had the abscess excised but states that the redness and swelling is extending and he is having more pain.  Patient states he tried to change his own dressing but has a hard time doing so as he cannot see the area.  Patient states he was not sure if he should have come here or go to the ER.  Patient states he does not want to repeat testing if he is going to be sent to the ER.  Vital signs: Please see EMR.  Physical exam: Please see exam.  Differential diagnosis: Cellulitis, necrotizing fasciitis, abscess, sepsis.  Based on patient's physical exam and rapid progression to the swelling and pain the patient send patient is being referred to the emergency department for further evaluation and workup.  Patient has declined CAT scan or labs here in the event that they may need to be repeated if he is being sent to the emergency room.  Due to patient's past medical history of recent osteomyelitis and uncontrolled diabetes patient is being referred to the ER.  Patient agrees with plan of care.        Medical Decision Making    54-year-old  male with history of diabetes, hypertension, dyslipidemia, osteomyelitis, sepsis presents today with complaints of worsening abscess to the left lower buttock region.  Patient states he was here couple days ago and had the abscess excised but states that the redness and swelling is extending and he is having more pain.  Patient states he tried to change his own dressing but has a hard time doing so as he cannot see the area.  Patient states he was not sure if he should have come here or go to the ER.  Patient states he does not want to repeat testing if he is going to be sent to the ER.    Problems Addressed:  Cellulitis of buttock: acute illness or injury    Amount and/or Complexity of Data Reviewed  External Data Reviewed: notes.    Risk  Decision regarding hospitalization.        Disposition and Plan     Clinical Impression:  1. Cellulitis of buttock         Disposition:  Ic to ed  5/20/2025  1:22 pm    Follow-up:  No follow-up provider specified.        Medications Prescribed:  Current Discharge Medication List                Supplementary Documentation:

## (undated) NOTE — ED AVS SNAPSHOT
Terrence Kumar   MRN: [id number]    Department:  [facility] Emergency Department   Date of Visit:  2/12/2018           Disclosure     Insurance plans vary and the physician(s) referred by the ER may not be covered by your plan.  Please contact your tell this physician (or your personal doctor if your instructions are to return to your personal doctor) about any new or lasting problems. The primary care or specialist physician will see patients referred from the [facility] Emergency Department.  Ky Gibbs

## (undated) NOTE — ED AVS SNAPSHOT
Eddie Perez   MRN: [id number]    Department:  [facility] Emergency Department   Date of Visit:  9/9/2017           Disclosure     Insurance plans vary and the physician(s) referred by the ER may not be covered by your plan.  Please contact your If you have been prescribed any medication(s), please fill your prescription right away and begin taking the medication(s) as directed    If the emergency physician has read X-rays, these will be re-interpreted by a radiologist.  If there is a significant

## (undated) NOTE — LETTER
10/05/17    Patient: Eddie Perez  : 10/25/1970 Visit date: 10/5/2017    Dear  Dr. Megan Arndt MD,    Thank you for referring Eddie Perez to my practice. Please find my assessment and plan below.                 Assessment   Left buttock abscess  (primar

## (undated) NOTE — ED AVS SNAPSHOT
Maryalexis Mcclain   MRN: [id number]    Department:  [facility] Emergency Department   Date of Visit:  3/2/2020           Disclosure     Insurance plans vary and the physician(s) referred by the ER may not be covered by your plan.  Please contact your tell this physician (or your personal doctor if your instructions are to return to your personal doctor) about any new or lasting problems. The primary care or specialist physician will see patients referred from the [facility] Emergency Department.  Arthor Bernheim

## (undated) NOTE — LETTER
August 26, 2017    Patient: Huan Hernández   Date of Visit: 8/26/2017       To Whom It May Concern:    Mayur Schulte was seen and treated in our emergency department on 8/26/2017. He can return to work.     If you have any questions or concerns, please d

## (undated) NOTE — LETTER
10/12/17    Patient: Satish Garrido  : 10/25/1970 Visit date: 10/12/2017    Dear  Dr. Idalia Valentino MD,    Thank you for referring Satish Garrido to my practice. Please find my assessment and plan below.                Assessment   Left buttock abscess  (primar

## (undated) NOTE — MR AVS SNAPSHOT
[place]  [address] [facility] [address]  [address] [facility] [id number] [phone]               Thank you for choosing us for your health care visit with Staci Frazier MD.  We are glad to serve you and happy to provide you with this s (NOTE: If you have chronic liver or kidney disease or ever had a stomach ulcer or GI bleeding, talk with your doctor before using these medicines.)  · Throat lozenges or sprays (such as Chloraseptic) help reduce pain.  Gargling with warm salt water will als promethazine-codeine 6.25-10 MG/5ML Syrp   Take 5 mL by mouth every 6 (six) hours as needed.    Commonly known as:  PHENERGAN with CODEINE                Where to Get Your Medications      These medications were sent to [facility]Y #[id number]- [place]